# Patient Record
Sex: MALE | URBAN - METROPOLITAN AREA
[De-identification: names, ages, dates, MRNs, and addresses within clinical notes are randomized per-mention and may not be internally consistent; named-entity substitution may affect disease eponyms.]

---

## 2019-08-01 ENCOUNTER — OFFICE VISIT (OUTPATIENT)
Dept: URBAN - METROPOLITAN AREA CLINIC 48 | Facility: CLINIC | Age: 24
End: 2019-08-01
Payer: COMMERCIAL

## 2019-08-01 DIAGNOSIS — H52.13 MYOPIA, BILATERAL: Primary | ICD-10-CM

## 2019-08-01 PROCEDURE — 92002 INTRM OPH EXAM NEW PATIENT: CPT | Performed by: OPHTHALMOLOGY

## 2019-08-01 ASSESSMENT — INTRAOCULAR PRESSURE
OD: 16
OS: 11

## 2019-08-01 NOTE — IMPRESSION/PLAN
Impression: Myopia, bilateral: H52.13. Plan: Chandra Doom exam otherwise normal.  Patient may proceed with LASIK OU.